# Patient Record
Sex: FEMALE | Race: BLACK OR AFRICAN AMERICAN | URBAN - METROPOLITAN AREA
[De-identification: names, ages, dates, MRNs, and addresses within clinical notes are randomized per-mention and may not be internally consistent; named-entity substitution may affect disease eponyms.]

---

## 2020-09-04 ENCOUNTER — LAB OUTSIDE AN ENCOUNTER (OUTPATIENT)
Dept: URBAN - METROPOLITAN AREA CLINIC 25 | Facility: CLINIC | Age: 55
End: 2020-09-04

## 2020-09-04 ENCOUNTER — OFFICE VISIT (OUTPATIENT)
Dept: URBAN - METROPOLITAN AREA CLINIC 25 | Facility: CLINIC | Age: 55
End: 2020-09-04
Payer: COMMERCIAL

## 2020-09-04 DIAGNOSIS — K76.9 LIVER LESION, RIGHT LOBE: ICD-10-CM

## 2020-09-04 DIAGNOSIS — K86.9 PANCREATIC LESION: ICD-10-CM

## 2020-09-04 DIAGNOSIS — Z80.0 FAMILY HISTORY OF PANCREATIC CANCER: ICD-10-CM

## 2020-09-04 DIAGNOSIS — K85.20 ALCOHOLIC PANCREATITIS: ICD-10-CM

## 2020-09-04 PROCEDURE — G9903 PT SCRN TBCO ID AS NON USER: HCPCS | Performed by: INTERNAL MEDICINE

## 2020-09-04 PROCEDURE — 3017F COLORECTAL CA SCREEN DOC REV: CPT | Performed by: INTERNAL MEDICINE

## 2020-09-04 PROCEDURE — G8420 CALC BMI NORM PARAMETERS: HCPCS | Performed by: INTERNAL MEDICINE

## 2020-09-04 PROCEDURE — G8427 DOCREV CUR MEDS BY ELIG CLIN: HCPCS | Performed by: INTERNAL MEDICINE

## 2020-09-04 PROCEDURE — 99214 OFFICE O/P EST MOD 30 MIN: CPT | Performed by: INTERNAL MEDICINE

## 2020-09-04 RX ORDER — AMLODIPINE BESYLATE 5 MG/1
TAKE 1 TABLET (5 MG) BY ORAL ROUTE ONCE DAILY TABLET ORAL 1
Qty: 0 | Refills: 0 | Status: ACTIVE | COMMUNITY
Start: 1900-01-01 | End: 1900-01-01

## 2020-09-04 RX ORDER — OMEPRAZOLE MAGNESIUM 10 MG/1
TAKE 2 PACKETS (20 MG) MIXED WITH 30 ML WATER, LET SIT 2-3 MINUTES, STIR AND DRINK WITHIN 30 MINUTES BY ORAL ROUTE ONCE DAILY GRANULE, DELAYED RELEASE ORAL 1
Qty: 1 | Refills: 0 | Status: ACTIVE | COMMUNITY
Start: 1900-01-01 | End: 1900-01-01

## 2020-09-04 NOTE — HPI-TODAY'S VISIT:
started having pancreatitis in 2018. related to alcohol. Maternal aunt and cousin  of pancreatic cancer.  1-2 margaritas every night. no abdominal pain today.  ER visit in aug 2020 - ct a/p with iv contrast -  CT scan of the abdomen and pelvis with contrast performed in May 2020 revealed pancreatitis with several small low-attenuation foci seen within the pancreas the largest measuring 14 mm in the head, mild pancreatic ductal dilation.  Fatty liver.  Indeterminate 16mm right hepatic lobe nodule.  Hiatal hernia.  Colonic diverticulosis.  Hysterectomy. EGD in 2018 revealed a small hiatal hernia, esophageal Candida, chronic gastritis.  Gastric biopsies did not reveal any H. pylori.  CT scan in 2018 of the abdomen and pelvis with contrast reveals pancreatitis, fatty liver, lobulated hyperenhancing mass in the right hepatic lobe measuring 0.8 x 1.4 cm in size. Colonoscopy in 2016 revealed pancolonic diverticulosis, internal hemorrhoids. CT abdomen pelvis with contrast on 2020 performed at [facility] for abdominal pain revealed an  ill-defined 1.6 x 1 cm hypoenhancing lesion in the uncinate process of the pancreas.  No pancreatic ductal dilation.  Mild inflammatory changes adjacent to the head and uncinate process.  Severe hepatic steatosis.  Lobulated enhancing lesion in segment 7 of the liver which could reflect a hemangioma.  Colonic diverticulosis. Labs revealed normal creatinine, total bilirubin 1.1, alkaline phosphatase 129, AST 96, ALT 80, lipase 215, hemoglobin 14.4.

## 2020-09-10 ENCOUNTER — TELEPHONE ENCOUNTER (OUTPATIENT)
Dept: URBAN - METROPOLITAN AREA CLINIC 92 | Facility: CLINIC | Age: 55
End: 2020-09-10

## 2020-09-17 ENCOUNTER — TELEPHONE ENCOUNTER (OUTPATIENT)
Dept: URBAN - METROPOLITAN AREA CLINIC 25 | Facility: CLINIC | Age: 55
End: 2020-09-17

## 2020-09-21 ENCOUNTER — OFFICE VISIT (OUTPATIENT)
Dept: URBAN - METROPOLITAN AREA MEDICAL CENTER 28 | Facility: MEDICAL CENTER | Age: 55
End: 2020-09-21
Payer: COMMERCIAL

## 2020-09-21 ENCOUNTER — TELEPHONE ENCOUNTER (OUTPATIENT)
Dept: URBAN - METROPOLITAN AREA CLINIC 92 | Facility: CLINIC | Age: 55
End: 2020-09-21

## 2020-09-21 DIAGNOSIS — K86.89 ATROPHIC PANCREAS: ICD-10-CM

## 2020-09-21 DIAGNOSIS — K76.89 ABNORMAL FINDING ON LIVER FUNCTION: ICD-10-CM

## 2020-09-21 DIAGNOSIS — R93.3 ABN FINDINGS-GI TRACT: ICD-10-CM

## 2020-09-21 PROCEDURE — 43259 EGD US EXAM DUODENUM/JEJUNUM: CPT | Performed by: INTERNAL MEDICINE

## 2020-09-21 RX ORDER — AMLODIPINE BESYLATE 5 MG/1
TAKE 1 TABLET (5 MG) BY ORAL ROUTE ONCE DAILY TABLET ORAL 1
Qty: 0 | Refills: 0 | Status: ACTIVE | COMMUNITY
Start: 1900-01-01 | End: 1900-01-01

## 2020-09-21 RX ORDER — OMEPRAZOLE MAGNESIUM 10 MG/1
TAKE 2 PACKETS (20 MG) MIXED WITH 30 ML WATER, LET SIT 2-3 MINUTES, STIR AND DRINK WITHIN 30 MINUTES BY ORAL ROUTE ONCE DAILY GRANULE, DELAYED RELEASE ORAL 1
Qty: 1 | Refills: 0 | Status: ACTIVE | COMMUNITY
Start: 1900-01-01 | End: 1900-01-01

## 2020-09-30 ENCOUNTER — TELEPHONE ENCOUNTER (OUTPATIENT)
Dept: URBAN - METROPOLITAN AREA CLINIC 92 | Facility: CLINIC | Age: 55
End: 2020-09-30

## 2020-12-21 ENCOUNTER — OFFICE VISIT (OUTPATIENT)
Dept: URBAN - METROPOLITAN AREA CLINIC 98 | Facility: CLINIC | Age: 55
End: 2020-12-21

## 2020-12-21 RX ORDER — OMEPRAZOLE MAGNESIUM 10 MG/1
TAKE 2 PACKETS (20 MG) MIXED WITH 30 ML WATER, LET SIT 2-3 MINUTES, STIR AND DRINK WITHIN 30 MINUTES BY ORAL ROUTE ONCE DAILY GRANULE, DELAYED RELEASE ORAL 1
Qty: 1 | Refills: 0 | COMMUNITY
Start: 1900-01-01

## 2020-12-21 RX ORDER — AMLODIPINE BESYLATE 5 MG/1
TAKE 1 TABLET (5 MG) BY ORAL ROUTE ONCE DAILY TABLET ORAL 1
Qty: 0 | Refills: 0 | COMMUNITY
Start: 1900-01-01

## 2021-03-26 ENCOUNTER — DASHBOARD ENCOUNTERS (OUTPATIENT)
Age: 56
End: 2021-03-26

## 2021-04-01 ENCOUNTER — OFFICE VISIT (OUTPATIENT)
Dept: URBAN - METROPOLITAN AREA CLINIC 98 | Facility: CLINIC | Age: 56
End: 2021-04-01